# Patient Record
Sex: MALE | Race: WHITE | Employment: FULL TIME | URBAN - METROPOLITAN AREA
[De-identification: names, ages, dates, MRNs, and addresses within clinical notes are randomized per-mention and may not be internally consistent; named-entity substitution may affect disease eponyms.]

---

## 2018-07-26 ENCOUNTER — HOSPITAL ENCOUNTER (OUTPATIENT)
Dept: PHYSICAL THERAPY | Age: 23
Discharge: HOME | End: 2018-07-26
Payer: COMMERCIAL

## 2018-07-26 PROCEDURE — 97110 THERAPEUTIC EXERCISES: CPT

## 2018-07-26 PROCEDURE — 97161 PT EVAL LOW COMPLEX 20 MIN: CPT

## 2018-07-26 NOTE — PROGRESS NOTES
[address] PHYS[facility] THERAPY AT [facility]  [address] [address], [place]  Phone [phone]  Fax [phone] / [address] [address] PHYSICAL THERAPY SERVICES  Patient Name: Akin Zaragoza : 1995   Medical   Diagnosis: Left foot pain [M79.672] Treatment Diagnosis: Left foot pain [M79.672]   Onset Date: 2017     Referral Source: Malcolm Lujan DPM Start of Care [facility]): 2018   Prior Hospitalization: See medical history Provider #: [id number]   Prior Level of Function: No pain with ambulation   Comorbidities: NA   Medications: Verified on Patient Summary List   The Plan of Care and following information is based on the information from the initial evaluation.   ===========================================================================================  Assessment / key information:  Patient is a 26 y.o. male who presents to [facility] with a diagnosis of Left foot pain [M79.672]. Patient is his own historian. Living situation is as follows: [place]. Occupation: manager at [employer]. Current Deficits include: pain, edema, decreased mobility, decreased strength, and decreased postural awareness with resulting limitations in ADL's and in functional abilities. Pt presents with c/o left foot pain localized to the volar surface of the foot. Pain began on 2017 when pt bent it the \"wrong way with a pallet galina\". Xrays initially showed no fracture, but when pain continued, pt sought a second medical opinion. He was placed in a left LE CAM boot for 6 months which was removed in 2018. Pt had repeat xrays at that time which showed healing. Pt continues to work FT at [employer] on concrete floors donning steel toe boots. Job duties require climbing ladders and working in [employer]. No numbness/red flags/ foot drop. Pain is primary complaint at this time. Impairments are as follows:   Pain 3/10 localized to the volar surface of the left 2nd and 3rd MTs    Posture mild pes planus and genu varum    Gait antalgic with significantly decreased stance in weightbearing LLE, decreased push off, and decreased stride    AROM/PROM:  Ankle in degrees  DF  L 10    R 15  PF  L 60 with pain   R 60  Eversion and inversion WFLs and pain free    Strength:  Left PF 4-/5 with pain  All other planes WNLs   5/5 strength left extensor hallucis    Special Tests:  circumferential measures:  Figure 8 at ankles: L 70cm R 69cm  Mid gastrocs: L 36cm R 37cm  10cm proximal to superior patellar poles: L 45cm R 47cm    + hypomobility between left 2nd and 3rd MTs     + Pain with isometric contraction and stretch of extensor digitorum LLE    Functional Tests  LLE SLS 4sec  2 heel raises with double leg stance    Functional Deficits include: unable to climb ladders, run, surf, kneel. Patient's FOTO score was a 40/100 indicating decreased function.  Patient will benefit from a POC addressing such impairments and limitations in order to improve quality of life and return to [facility].    ==================================================================================Eval Complexity: History: LOW Complexity : Zero comorbidities / personal factors that will impact the outcome / POCExam:HIGH Complexity : 4+ Standardized tests and measures addressing body structure, function, activity limitation and / or participation in recreation  Presentation: LOW Complexity : Stable, uncomplicated  Clinical Decision Making:MEDIUM Complexity : FOTO score of 26-74Overall Complexity:LOW   Problem List: pain affecting function, decrease ROM, decrease strength, edema affecting function and impaired gait/ balance   Treatment Plan may include any combination of the following: Therapeutic exercise, Therapeutic activities, Neuromuscular re-education, Physical agent/modality, Gait/balance training, Manual therapy, Patient education, Self Care training, Functional mobility training, Home safety training and Stair training  Patient / Family readiness to learn indicated by: asking questions, trying to perform skills and interest  Persons(s) to be included in education: patient (P)  Barriers to Learning/Limitations: None  Measures taken: A HEP was initiated to assist with POC in restoring function   Patient Goal (s): Surf and walk without pain   Patient self reported health status: good  Rehabilitation Potential: excellent   Short Term Goals: To be accomplished in  2  treatments:  1. Pt will be compliant with HEP for symptom management at home.  Long Term Goals: To be accomplished in  8-12  treatments:  1. Pt will demonstrate an increased FOTO score to 63/100 in order to improve function  2. Pt will be independent with HEP at D/C for self management. 3. Pt will demonstrate decreased edema in the left ankle by 1cm in order to increase mobility for functional ambulation  4. Pt will demonstrate increased girth in the mid gastrocs and 10cm proximal to superior patellar poles by 1cm+ in order to aid in resolving atrophy and improving ability to ambulate  5. Pt will be able to perform LLE SLS on dynamic surface for 20sec+ in order to increase balance required for surfing  6. Pt will be able to ambulate 1 mile on TM with proper footwear and without c/o left foot pain in order to negotiate [employer] concrete floor as before    Frequency / Duration:     Patient to be seen  2  times per week for 8-12  treatments:  Patient / Caregiver education and instruction: exercises  G-Forrest (GP):   Therapist Signature: Carmen Stout PT Date: 9/54/0536   Certification Period:  Time: 11:06 AM   ===========================================================================================  I certify that the above Physical Therapy Services are being furnished while the patient is under my care.   I agree with the treatment plan and certify that this therapy is necessary. Physician Signature:        Date:       Time:     Please sign and return to [facility] or you may fax the signed copy to [phone]. Thank you.

## 2018-07-26 NOTE — PROGRESS NOTES
PT ANKLE EVAL AND TREATMENT     Patient Name: Betito Mcgregor  Date:2018  : 1995  [x]  Patient  Verified  Payor: Daniele Levin / Plan: [facility] PPO / Product Type: PPO /    In time:840  Out time: 925  Total Treatment Time (min): 45  Total Timed Codes (min): 25  1:1 Treatment Time ( only):    Visit #: 1 of     Treatment Area: Left foot pain [M79.672]    SUBJECTIVE  Pain Level (0-10 on visual analog scale): 3  Any medication changes, allergies to medications, diagnosis change, or new procedure performed?: see summary sheet for update  Subjective functional status/changes:   [] No changes reported  Patient is a 26 y.o. male who presents to [facility] with a diagnosis of Left foot pain [M79.672]. Patient is his own historian. Living situation is as follows: [place]. Occupation: manager at [employer]. Current Deficits include: pain, edema, decreased mobility, decreased strength, and decreased postural awareness with resulting limitations in ADL's and in functional abilities. Pt presents with c/o left foot pain localized to the volar surface of the foot. Pain began on 2017 when pt bent it the \"wrong way with a pallet galina\". Xrays initially showed no fracture, but when pain continued, pt sought a second medical opinion. He was placed in a left LE CAM boot for 6 months which was removed in 2018. Pt had repeat xrays at that time which showed healing. Pt continues to work FT at [employer] on concrete floors donning steel toe boots. Job duties require climbing ladders and working in [employer]. No numbness/red flags/ foot drop. Pain is primary complaint at this time.      Impairments are as follows:   Pain 3/10 localized to the volar surface of the left 2nd and 3rd MTs    Posture mild pes planus and genu varum    Gait antalgic with significantly decreased stance in weightbearing LLE, decreased push off, and decreased stride    AROM/PROM:  Ankle in degrees  DF  L 10    R 15  PF  L 60 with pain   R 60  Eversion and inversion WFLs and pain free    Strength:  Left PF 4-/5 with pain  All other planes WNLs   5/5 strength left extensor hallucis    Special Tests:  circumferential measures:  Figure 8 at ankles: L 70cm R 69cm  Mid gastrocs: L 36cm R 37cm  10cm proximal to superior patellar poles: L 45cm R 47cm    + hypomobility between left 2nd and 3rd MTs     + Pain with isometric contraction and stretch of extensor digitorum LLE    Functional Tests  LLE SLS 4sec  2 heel raises with double leg stance    Patient Education: [x] Established HEP    [x] POT   (minutes) : 25min HEP administered and reviewed   Patient with verbalized and demonstrated understanding of HEP/POC.      Pain Level (0-10 scale) post treatment: 3     ASSESSMENT  [x]  See Plan of Care    PLAN  [x]  Upgrade activities as tolerated     [x] Other:_    See POC for Frequency/duration of visits         Justification for Eval Code Complexity:   Patient History : see POC  Examination: (see exam)  Clinical Presentation: stable  Clinical Decision Making : FOTO : 40 /100     G-Codes (GP):     Mattie Hall, PT 7/26/2018  11:30 AM

## 2018-07-31 ENCOUNTER — HOSPITAL ENCOUNTER (OUTPATIENT)
Dept: PHYSICAL THERAPY | Age: 23
Discharge: HOME | End: 2018-07-31
Payer: COMMERCIAL

## 2018-07-31 PROCEDURE — 97140 MANUAL THERAPY 1/> REGIONS: CPT | Performed by: PHYSICAL THERAPIST

## 2018-07-31 PROCEDURE — 97110 THERAPEUTIC EXERCISES: CPT | Performed by: PHYSICAL THERAPIST

## 2018-07-31 NOTE — PROGRESS NOTES
PT DAILY TREATMENT NOTE     Patient Name: Cony Arnold  Date:2018  : 1995  [x]  Patient  Verified  Payor: BLUE CROSS / Plan: [facility] PPO / Product Type: PPO /    In time:230  Out time:328  Total Treatment Time (min): 62  Visit #: 2 of     Treatment Area: Left foot pain [M79.672]    SUBJECTIVE  Pain Level (0-10 scale): 210  Any medication changes, allergies to medications, adverse drug reactions, diagnosis change, or new procedure performed?: [x] No    [] Yes (see summary sheet for update)  Subjective functional status/changes:   [] No changes reported  Pt retold subjective hx.      OBJECTIVE    Modality rationale: decrease pain and increase tissue extensibility to improve the patients ability to improve standing tolerance   Min Type Additional Details    [] Estim:  []Unatt       []IFC  []Premod                        []Other:  []w/ice   []w/heat  Position:  Location:    [] Estim: []Att    []TENS instruct  []NMES                    []Other:  []w/US   []w/ice   []w/heat  Position:  Location:    []  Traction: [] Cervical       []Lumbar                       [] Prone          []Supine                       []Intermittent   []Continuous Lbs:  [] before manual  [] after manual    []  Ultrasound: []Continuous   [] Pulsed                           []1MHz   []3MHz W/cm2:  Location:    []  Iontophoresis with dexamethasone         Location: [] Take home patch   [] In clinic   10 []  Ice     [x]  heat  []  Ice massage  []  Laser   []  Anodyne Position: semi-reclined  Location: L foot    []  Laser with stim  []  Other:  Position:  Location:    []  Vasopneumatic Device Pressure:       [] lo [] med [] hi   Temperature: [] lo [] med [] hi   [] Skin assessment post-treatment:  []intact []redness- no adverse reaction    []redness - adverse reaction:       38 min Therapeutic Exercise:  [x] See flow sheet :initiated PT POC   Rationale: increase ROM, increase strength, improve balance and increase proprioception to improve the patients ability to improve overall activity tolerance    10 min Manual Therapy: Technique:      [x] S/DTM []IASTM []PROM [] Passive Stretching   [] Graston:  [] GT 1  [] GT 2 [x] GT 3 [x] GT 4 [] GT 4 [] GT 5  [] GT 6  [] Sweep [] Fan [] [place]  [] Brush   []  Swivel []J- Stroke [] Scoop []IFraming     []Manual TPR  [] TDN (see objective; actual needle insertion time not billed)  []Jt manipulation:Gr I [] II []  III [] IV[] V[]  Treatment/Area:  L plantar fascia   Rationale:      decrease pain, increase ROM, increase tissue extensibility and decrease trigger points to improve patient's ability to improve weight-bearing tolerance        With   [] TE   [] TA   [] neuro   [] other: Patient Education: [x] Review HEP    [] Progressed/Changed HEP based on:   [] positioning   [] body mechanics   [] transfers   [] heat/ice application    [] Graston Education: Explained the effects and benefits of Graston Technique therapy including potential for post treatment soreness and bruising. [] Other:      Other Objective/Functional Measures:   Noted tightness in L plantar fascia       Pain Level (0-10 scale) post treatment: 0/10    ASSESSMENT/Changes in Function: Pt tolerated all therex without increased pain. Noted tightness in L plantar fascia due to decreased heel toe pattern while wearing the boot for prolonged period time. Restrictions lessened after manual interventions. Pt educated to wear tennis shoes to do standing therex to improve foot stability. Patient will continue to benefit from skilled PT services to modify and progress therapeutic interventions, address functional mobility deficits, address ROM deficits, address strength deficits, analyze and address soft tissue restrictions and address imbalance/dizziness to attain remaining goals.      []  See Plan of Care  []  See progress note/recertification  []  See Discharge Summary         Progress towards goals / Updated goals:  1st visit, initiated PT POC    PLAN  [x]  Upgrade activities as tolerated     [x]  Continue plan of care  []  Update interventions per flow sheet       []  Discharge due to:_  [x]  Other:_ assess effects of treatment next session     Myra Gandhi DPT 7/31/2018  3:32 PM    Future Appointments  Date Time Provider Vishnu Yi   8/2/2018 11:00 AM Myra Gandhi, DPT [facility]   8/7/2018 11:00 AM Myra Gandhi, DPT [facility]   8/9/2018 11:00 AM Myra Gandhi, DPT [facility]   8/14/2018 11:00 AM Myra Gandhi, DPT [facility]   8/16/2018 11:00 AM Myra Gandhi, DPT [facility]   8/21/2018 11:00 AM Myra Gandhi, DPT [facility]   8/23/2018 11:00 AM Myra Gandhi, DPT [facility]   8/28/2018 11:00 AM Myra Gandhi, DPT [facility]   8/30/2018 11:00 AM Myra Gandhi, DPT [facility]

## 2018-08-02 ENCOUNTER — APPOINTMENT (OUTPATIENT)
Dept: PHYSICAL THERAPY | Age: 23
End: 2018-08-02
Payer: COMMERCIAL

## 2018-08-07 ENCOUNTER — HOSPITAL ENCOUNTER (OUTPATIENT)
Dept: PHYSICAL THERAPY | Age: 23
Discharge: HOME | End: 2018-08-07
Payer: COMMERCIAL

## 2018-08-07 PROCEDURE — 97140 MANUAL THERAPY 1/> REGIONS: CPT | Performed by: PHYSICAL THERAPIST

## 2018-08-07 PROCEDURE — 97110 THERAPEUTIC EXERCISES: CPT | Performed by: PHYSICAL THERAPIST

## 2018-08-07 NOTE — PROGRESS NOTES
PT DAILY TREATMENT NOTE     Patient Name: Julianna Fountain  Date:2018  : 1995  [x]  Patient  Verified  Payor: BLUE CROSS / Plan: [facility] PPO / Product Type: PPO /    In time:1100 Out time:1150  Total Treatment Time (min): 50  Visit #: 3 of     Treatment Area: Left foot pain [M79.672]    SUBJECTIVE  Pain Level (0-10 scale): 0/10  Any medication changes, allergies to medications, adverse drug reactions, diagnosis change, or new procedure performed?: [x] No    [] Yes (see summary sheet for update)  Subjective functional status/changes:   [] No changes reported  Pt stated that he was sore after last session but he has had no pain since last session. He notes though that his R side of the body has started to get sore.        OBJECTIVE    Modality rationale: decrease pain and increase tissue extensibility to improve the patients ability to improve standing tolerance   Min Type Additional Details    [] Estim:  []Unatt       []IFC  []Premod                        []Other:  []w/ice   []w/heat  Position:  Location:    [] Estim: []Att    []TENS instruct  []NMES                    []Other:  []w/US   []w/ice   []w/heat  Position:  Location:    []  Traction: [] Cervical       []Lumbar                       [] Prone          []Supine                       []Intermittent   []Continuous Lbs:  [] before manual  [] after manual    []  Ultrasound: []Continuous   [] Pulsed                           []1MHz   []3MHz W/cm2:  Location:    []  Iontophoresis with dexamethasone         Location: [] Take home patch   [] In clinic   10 []  Ice     [x]  heat  []  Ice massage  []  Laser   []  Anodyne Position: semi-reclined  Location: L foot    []  Laser with stim  []  Other:  Position:  Location:    []  Vasopneumatic Device Pressure:       [] lo [] med [] hi   Temperature: [] lo [] med [] hi   [] Skin assessment post-treatment:  []intact []redness- no adverse reaction    []redness - adverse reaction:       30 min Therapeutic Exercise:  [x] See flow sheet :added therex per flow sheet   Rationale: increase ROM, increase strength, improve balance and increase proprioception to improve the patients ability to improve overall activity tolerance    10 min Manual Therapy: Technique:      [x] S/DTM []IASTM []PROM [] Passive Stretching   [] Graston:  [] GT 1  [] GT 2 [x] GT 3 [x] GT 4 [] GT 4 [] GT 5  [] GT 6  [] Sweep [] Fan [] [place]  [] Brush   []  Swivel []J- Stroke [] Scoop []IFraming     []Manual TPR  [] TDN (see objective; actual needle insertion time not billed)  []Jt manipulation:Gr I [] II []  III [] IV[] V[]  Treatment/Area:  L plantar fascia   Rationale:      decrease pain, increase ROM, increase tissue extensibility and decrease trigger points to improve patient's ability to improve weight-bearing tolerance        With   [] TE   [] TA   [] neuro   [] other: Patient Education: [x] Review HEP    [] Progressed/Changed HEP based on:   [] positioning   [] body mechanics   [] transfers   [] heat/ice application    [] Graston Education: Explained the effects and benefits of Graston Technique therapy including potential for post treatment soreness and bruising. [] Other:      Other Objective/Functional Measures:   Reduced tightness in the L plantar fascia         Pain Level (0-10 scale) post treatment: 0/10    ASSESSMENT/Changes in Function: Pt tolerated progressed therex without increased pain and improved stability. Noted improvement in overall muscle flexibility with reduced pain. Assess ability to return to running next session if no pain. Patient will continue to benefit from skilled PT services to modify and progress therapeutic interventions, address functional mobility deficits, address ROM deficits, address strength deficits, analyze and address soft tissue restrictions and address imbalance/dizziness to attain remaining goals. Progress towards goals / Updated goals: · Short Term Goals:  To be accomplished in  2  treatments:  1. Pt will be compliant with HEP for symptom management at home.        · Long Term Goals: To be accomplished in  8-12  treatments:  1. Pt will demonstrate an increased FOTO score to 63/100 in order to improve function  2. Pt will be independent with HEP at D/ for self management. 3. Pt will demonstrate decreased edema in the left ankle by 1cm in order to increase mobility for functional ambulation  4. Pt will demonstrate increased girth in the mid gastrocs and 10cm proximal to superior patellar poles by 1cm+ in order to aid in resolving atrophy and improving ability to ambulate  5. Pt will be able to perform LLE SLS on dynamic surface for 20sec+ in order to increase balance required for surfing  6.  Pt will be able to ambulate 1 mile on  with proper footwear and without c/o left foot pain in order to negotiate [facility] floor as before       PLAN  [x]  Upgrade activities as tolerated     [x]  Continue plan of care  []  Update interventions per flow sheet       []  Discharge due to:_  [x]  Other:_check goals    Janie Lackey DPT 8/7/2018  3:21 PM    Future Appointments  Date Time Provider Vishnu Yi   8/9/2018 11:00 AM Janie Lackey DPT [facility]   8/14/2018 11:00 AM Janie Lackey DPT [facility]   8/16/2018 11:00 AM Janie Lackey DPT [facility]   8/21/2018 11:00 AM Janie Lackey DPT [facility]   8/23/2018 11:00 AM Janie Lackey DPT [facility]   8/28/2018 11:00 AM Janie Lackey DPT [facility]   8/30/2018 11:00 AM Janie Lackey DPT [facility]

## 2018-08-09 ENCOUNTER — HOSPITAL ENCOUNTER (OUTPATIENT)
Dept: PHYSICAL THERAPY | Age: 23
Discharge: HOME | End: 2018-08-09
Payer: COMMERCIAL

## 2018-08-09 PROCEDURE — 97110 THERAPEUTIC EXERCISES: CPT | Performed by: PHYSICAL THERAPIST

## 2018-08-09 PROCEDURE — 97140 MANUAL THERAPY 1/> REGIONS: CPT | Performed by: PHYSICAL THERAPIST

## 2018-08-09 NOTE — PROGRESS NOTES
PT DAILY TREATMENT NOTE     Patient Name: Sherry Vasquez  Date:2018  : 1995  [x]  Patient  Verified  Payor: BLUE CROSS / Plan: [facility] BLUE [facility] PPO / Product Type: PPO /    In time:1100 Out time:1150  Total Treatment Time (min): 50  Visit #: 4 of     Treatment Area: Left foot pain [M79.672]    SUBJECTIVE  Pain Level (0-10 scale): 0/10  Any medication changes, allergies to medications, adverse drug reactions, diagnosis change, or new procedure performed?: [x] No    [] Yes (see summary sheet for update)  Subjective functional status/changes:   [] No changes reported  Pt notes that he was able to run 2 miles straight with a little walking however, he had increased soreness in the L foot for a few days following.       OBJECTIVE    Modality rationale: decrease pain and increase tissue extensibility to improve the patients ability to improve standing tolerance   Min Type Additional Details    [] Estim:  []Unatt       []IFC  []Premod                        []Other:  []w/ice   []w/heat  Position:  Location:    [] Estim: []Att    []TENS instruct  []NMES                    []Other:  []w/US   []w/ice   []w/heat  Position:  Location:    []  Traction: [] Cervical       []Lumbar                       [] Prone          []Supine                       []Intermittent   []Continuous Lbs:  [] before manual  [] after manual    []  Ultrasound: []Continuous   [] Pulsed                           []1MHz   []3MHz W/cm2:  Location:    []  Iontophoresis with dexamethasone         Location: [] Take home patch   [] In clinic   10 []  Ice     [x]  heat  []  Ice massage  []  Laser   []  Anodyne Position: semi-reclined  Location: L foot    []  Laser with stim  []  Other:  Position:  Location:    []  Vasopneumatic Device Pressure:       [] lo [] med [] hi   Temperature: [] lo [] med [] hi   [] Skin assessment post-treatment:  []intact []redness- no adverse reaction    []redness - adverse reaction:       30 min Therapeutic Exercise:  [x] See flow sheet :added therex per flow sheet   Rationale: increase ROM, increase strength, improve balance and increase proprioception to improve the patients ability to improve overall activity tolerance    10 min Manual Therapy: Technique:      [x] S/DTM []IASTM []PROM [] Passive Stretching   [] Graston:  [] GT 1  [] GT 2 [x] GT 3 [x] GT 4 [] GT 4 [] GT 5  [] GT 6  [] Sweep [] Fan [] [place]  [] Brush   []  Swivel []J- Stroke [] Scoop []IFraming     []Manual TPR  [] TDN (see objective; actual needle insertion time not billed)  []Jt manipulation:Gr I [] II []  III [] IV[] V[]  Treatment/Area:  L plantar fascia/dorsum of the foot   Rationale:      decrease pain, increase ROM, increase tissue extensibility and decrease trigger points to improve patient's ability to improve weight-bearing tolerance        With   [] TE   [] TA   [] neuro   [] other: Patient Education: [x] Review HEP    [] Progressed/Changed HEP based on:   [] positioning   [] body mechanics   [] transfers   [] heat/ice application    [] Graston Education: Explained the effects and benefits of Graston Technique therapy including potential for post treatment soreness and bruising. [] Other:      Other Objective/Functional Measures:   Reduced tightness in the L plantar fasica  Pt was able to perform standing therex without increased pain         Pain Level (0-10 scale) post treatment: 0/10    ASSESSMENT/Changes in Function: Pt tolerated added standing therex without increased pain in the L foot. High impact activities continue to bother fx site on the L foot. Pt educated to trial walk/run program and slowly return to long distance running to return to PLOF.      Patient will continue to benefit from skilled PT services to modify and progress therapeutic interventions, address functional mobility deficits, address ROM deficits, address strength deficits, analyze and address soft tissue restrictions and address imbalance/dizziness to attain remaining goals. Progress towards goals / Updated goals: · Short Term Goals: To be accomplished in  2  treatments:  1. Pt will be compliant with HEP for symptom management at home.        · Long Term Goals: To be accomplished in  8-12  treatments:  1. Pt will demonstrate an increased FOTO score to 63/100 in order to improve function  2. Pt will be independent with HEP at D/C for self management. 3. Pt will demonstrate decreased edema in the left ankle by 1cm in order to increase mobility for functional ambulation  4. Pt will demonstrate increased girth in the mid gastrocs and 10cm proximal to superior patellar poles by 1cm+ in order to aid in resolving atrophy and improving ability to ambulate  5. Pt will be able to perform LLE SLS on dynamic surface for 20sec+ in order to increase balance required for surfing  6.  Pt will be able to ambulate 1 mile on TM with proper footwear and without c/o left foot pain in order to negotiate [employer] concrete floor as beforeprogressing, pt ran 2 miles with pain 8/9/18       PLAN  [x]  Upgrade activities as tolerated     [x]  Continue plan of care  []  Update interventions per flow sheet       []  Discharge due to:_  []  Other:_    Andie Montgomery DPT 8/9/2018  3:21 PM    Future Appointments  Date Time Provider Vishnu Yi   8/14/2018 11:00 AM Andie Montgomery DPT [facility]   8/16/2018 11:00 AM Andie Montgomery DPT [facility]   8/21/2018 11:00 AM Andie Montgomery DPT [facility]   8/23/2018 11:00 AM Andie Montgomery DPT [facility]   8/28/2018 11:00 AM Andie Montgomery DPT [facility]   8/30/2018 11:00 AM Andie Montgomery DPT [facility]

## 2018-08-14 ENCOUNTER — HOSPITAL ENCOUNTER (OUTPATIENT)
Dept: PHYSICAL THERAPY | Age: 23
Discharge: HOME | End: 2018-08-14
Payer: COMMERCIAL

## 2018-08-14 PROCEDURE — 97110 THERAPEUTIC EXERCISES: CPT | Performed by: PHYSICAL THERAPIST

## 2018-08-14 NOTE — PROGRESS NOTES
PT DAILY TREATMENT NOTE     Patient Name: Candie Castillo  Date:2018  : 1995  [x]  Patient  Verified  Payor: BLUE CROSS / Plan: [facility] PPO / Product Type: PPO /    In time:1100 Out time:1149  Total Treatment Time (min): 49  1:1 treatment time: 19  Visit #: 5 of     Treatment Area: Left foot pain [M79.922]    SUBJECTIVE  Pain Level (0-10 scale): 0.5/10  Any medication changes, allergies to medications, adverse drug reactions, diagnosis change, or new procedure performed?: [x] No    [] Yes (see summary sheet for update)  Subjective functional status/changes:   [] No changes reported  Pt reports that he ran/walked for 2 miles and had less pain.        OBJECTIVE    Modality rationale: decrease pain and increase tissue extensibility to improve the patients ability to improve standing tolerance   Min Type Additional Details    [] Estim:  []Unatt       []IFC  []Premod                        []Other:  []w/ice   []w/heat  Position:  Location:    [] Estim: []Att    []TENS instruct  []NMES                    []Other:  []w/US   []w/ice   []w/heat  Position:  Location:    []  Traction: [] Cervical       []Lumbar                       [] Prone          []Supine                       []Intermittent   []Continuous Lbs:  [] before manual  [] after manual    []  Ultrasound: []Continuous   [] Pulsed                           []1MHz   []3MHz W/cm2:  Location:    []  Iontophoresis with dexamethasone         Location: [] Take home patch   [] In clinic   PD []  Ice     [x]  heat  []  Ice massage  []  Laser   []  Anodyne Position: semi-reclined  Location: L foot    []  Laser with stim  []  Other:  Position:  Location:    []  Vasopneumatic Device Pressure:       [] lo [] med [] hi   Temperature: [] lo [] med [] hi   [] Skin assessment post-treatment:  []intact []redness- no adverse reaction    []redness - adverse reaction:       49 min Therapeutic Exercise:  [x] See flow sheet :progressed PREs per flow sheet   Rationale: increase ROM, increase strength, improve balance and increase proprioception to improve the patients ability to improve overall activity tolerance    H min Manual Therapy: Technique:      [x] S/DTM []IASTM []PROM [] Passive Stretching   [] Graston:  [] GT 1  [] GT 2 [x] GT 3 [x] GT 4 [] GT 4 [] GT 5  [] GT 6  [] Sweep [] Fan [] [place]  [] Brush   []  Swivel []J- Stroke [] Scoop []IFraming     []Manual TPR  [] TDN (see objective; actual needle insertion time not billed)  []Jt manipulation:Gr I [] II []  III [] IV[] V[]  Treatment/Area:  L plantar fascia/dorsum of the foot   Rationale:      decrease pain, increase ROM, increase tissue extensibility and decrease trigger points to improve patient's ability to improve weight-bearing tolerance        With   [] TE   [] TA   [] neuro   [] other: Patient Education: [x] Review HEP    [] Progressed/Changed HEP based on:   [] positioning   [] body mechanics   [] transfers   [] heat/ice application    [] Graston Education: Explained the effects and benefits of Graston Technique therapy including potential for post treatment soreness and bruising. [] Other:      Other Objective/Functional Measures:            Pain Level (0-10 scale) post treatment: 0/10    ASSESSMENT/Changes in Function:   Pt tolerated added high impact exercise without increased foot pain. Add plyometric exercises next session. Patient will continue to benefit from skilled PT services to modify and progress therapeutic interventions, address functional mobility deficits, address ROM deficits, address strength deficits, analyze and address soft tissue restrictions and address imbalance/dizziness to attain remaining goals. Progress towards goals / Updated goals: · Short Term Goals: To be accomplished in  2  treatments:  1. Pt will be compliant with HEP for symptom management at home.        · Long Term Goals: To be accomplished in  8-12  treatments:  1.  Pt will demonstrate an increased FOTO score to 63/100 in order to improve function  2. Pt will be independent with HEP at D/C for self management. 3. Pt will demonstrate decreased edema in the left ankle by 1cm in order to increase mobility for functional ambulation  4. Pt will demonstrate increased girth in the mid gastrocs and 10cm proximal to superior patellar poles by 1cm+ in order to aid in resolving atrophy and improving ability to ambulate  5. Pt will be able to perform LLE SLS on dynamic surface for 20sec+ in order to increase balance required for surfing  6.  Pt will be able to ambulate 1 mile on TM with proper footwear and without c/o left foot pain in order to negotiate [employer] concrete floor as beforeprogressing, pt ran 2 miles with pain 8/9/18       PLAN  [x]  Upgrade activities as tolerated     [x]  Continue plan of care  []  Update interventions per flow sheet       []  Discharge due to:_  [x]  Other: check goals next session    Inocencio Godinez DPT 8/14/2018  229  PM    Future Appointments  Date Time Provider Vishnu Yi   8/16/2018 11:00 AM Inocencio Godinez DPT [facility]   8/21/2018 11:00 AM Inocencio Godinez DPT [facility]   8/23/2018 11:00 AM Inocencio Godinez DPT [facility]   8/28/2018 11:00 AM Inocencio Godinez DPT [facility]   8/30/2018 11:00 AM Inocencio Godinez DPT [facility]

## 2018-08-16 ENCOUNTER — HOSPITAL ENCOUNTER (OUTPATIENT)
Dept: PHYSICAL THERAPY | Age: 23
Discharge: HOME | End: 2018-08-16
Payer: COMMERCIAL

## 2018-08-16 PROCEDURE — 97110 THERAPEUTIC EXERCISES: CPT | Performed by: PHYSICAL THERAPIST

## 2018-08-21 ENCOUNTER — HOSPITAL ENCOUNTER (OUTPATIENT)
Dept: PHYSICAL THERAPY | Age: 23
Discharge: HOME | End: 2018-08-21
Payer: COMMERCIAL

## 2018-08-21 PROCEDURE — 97110 THERAPEUTIC EXERCISES: CPT | Performed by: PHYSICAL THERAPIST

## 2018-08-21 NOTE — PROGRESS NOTES
PT DAILY TREATMENT NOTE     Patient Name: Candie Castillo  Date:2018  : 1995  [x]  Patient  Verified  Payor: BLUE CROSS / Plan: [facility] PPO / Product Type: PPO /    In time:1100 Out time:1146  Total Treatment Time (min): 46  1:1 treatment time: 30  Visit #: 7 of     Treatment Area: Left foot pain [M79.132]    SUBJECTIVE  Pain Level (0-10 scale): 0/10  Any medication changes, allergies to medications, adverse drug reactions, diagnosis change, or new procedure performed?: [x] No    [] Yes (see summary sheet for update)  Subjective functional status/changes:   [x] No changes reported  I have no pain    OBJECTIVE    Modality rationale: decrease pain and increase tissue extensibility to improve the patients ability to improve standing tolerance   Min Type Additional Details    [] Estim:  []Unatt       []IFC  []Premod                        []Other:  []w/ice   []w/heat  Position:  Location:    [] Estim: []Att    []TENS instruct  []NMES                    []Other:  []w/US   []w/ice   []w/heat  Position:  Location:    []  Traction: [] Cervical       []Lumbar                       [] Prone          []Supine                       []Intermittent   []Continuous Lbs:  [] before manual  [] after manual    []  Ultrasound: []Continuous   [] Pulsed                           []1MHz   []3MHz W/cm2:  Location:    []  Iontophoresis with dexamethasone         Location: [] Take home patch   [] In clinic   PD []  Ice     [x]  heat  []  Ice massage  []  Laser   []  Anodyne Position: semi-reclined  Location: L foot    []  Laser with stim  []  Other:  Position:  Location:    []  Vasopneumatic Device Pressure:       [] lo [] med [] hi   Temperature: [] lo [] med [] hi   [] Skin assessment post-treatment:  []intact []redness- no adverse reaction    []redness - adverse reaction:       46 min Therapeutic Exercise:  [x] See flow sheet    Rationale: increase ROM, increase strength, improve balance and increase proprioception to improve the patients ability to improve overall activity tolerance    H min Manual Therapy: Technique:      [x] S/DTM []IASTM []PROM [] Passive Stretching   [] Graston:  [] GT 1  [] GT 2 [x] GT 3 [x] GT 4 [] GT 4 [] GT 5  [] GT 6  [] Sweep [] Fan [] [place]  [] Brush   []  Swivel []J- Stroke [] Scoop []IFraming     []Manual TPR  [] TDN (see objective; actual needle insertion time not billed)  []Jt manipulation:Gr I [] II []  III [] IV[] V[]  Treatment/Area:  L plantar fascia/dorsum of the foot   Rationale:      decrease pain, increase ROM, increase tissue extensibility and decrease trigger points to improve patient's ability to improve weight-bearing tolerance        With   [] TE   [] TA   [] neuro   [] other: Patient Education: [x] Review HEP    [] Progressed/Changed HEP based on:   [] positioning   [] body mechanics   [] transfers   [] heat/ice application    [] Graston Education: Explained the effects and benefits of Graston Technique therapy including potential for post treatment soreness and bruising. [] Other:      Other Objective/Functional Measures:        Pain Level (0-10 scale) post treatment: 0/10    ASSESSMENT/Changes in Function:   Pt continues to have no pain with high level plyometric activities. Pt due for reassessment next session and possible DC. Patient will continue to benefit from skilled PT services to modify and progress therapeutic interventions, address functional mobility deficits, address ROM deficits, address strength deficits, analyze and address soft tissue restrictions and address imbalance/dizziness to attain remaining goals. Progress towards goals / Updated goals: · Short Term Goals: To be accomplished in  2  treatments:  1. Pt will be compliant with HEP for symptom management at home. Met 8/16/18        · Long Term Goals: To be accomplished in  8-12  treatments:  1.  Pt will demonstrate an increased FOTO score to 63/100 in order to improve function  2. Pt will be independent with HEP at D/C for self management. met 8/21/18  3. Pt will demonstrate decreased edema in the left ankle by 1cm in order to increase mobility for functional ambulation  4. Pt will demonstrate increased girth in the mid gastrocs and 10cm proximal to superior patellar poles by 1cm+ in order to aid in resolving atrophy and improving ability to ambulate  5. Pt will be able to perform LLE SLS on dynamic surface for 20sec+ in order to increase balance required for surfing  6. Pt will be able to ambulate 1 mile on TM with proper footwear and without c/o left foot pain in order to negotiate [email or web address] concrete floor as beforeprogressing, pt ran 2 miles with pain 8/9/18       PLAN  [x]  Upgrade activities as tolerated     [x]  Continue plan of care  []  Update interventions per flow sheet       []  Discharge due to:_  [x]  Other: check goals next session for DC.      Denise Jain DPT 8/21/2018  128 PM    Future Appointments  Date Time Provider Vishnu Yi   8/23/2018 11:00 AM Denise Jain DPT [facility]   8/28/2018 11:00 AM Denise Jain DPT [facility]   8/30/2018 11:00 AM Denise Jain DPT [facility]

## 2018-08-23 ENCOUNTER — HOSPITAL ENCOUNTER (OUTPATIENT)
Dept: PHYSICAL THERAPY | Age: 23
Discharge: HOME | End: 2018-08-23
Payer: COMMERCIAL

## 2018-08-23 PROCEDURE — 97110 THERAPEUTIC EXERCISES: CPT | Performed by: PHYSICAL THERAPIST

## 2018-08-23 NOTE — PROGRESS NOTES
PT DAILY TREATMENT NOTE     Patient Name: Ana Maria Rutherford  Date:2018  : 1995  [x]  Patient  Verified  Payor: BLUE CROSS / Plan: [facility] PPO / Product Type: PPO /    In time:1101 Out time:1138  Total Treatment Time (min): 37  1:1 treatment time: 20  Visit #:8 of     Treatment Area: Left foot pain [M79.122]    SUBJECTIVE  Pain Level (0-10 scale): 0/10  Any medication changes, allergies to medications, adverse drug reactions, diagnosis change, or new procedure performed?: [x] No    [] Yes (see summary sheet for update)  Subjective functional status/changes:   [x] No changes reported  Pt denies pain with functional mobility. He reports that he has returned to a run/walk program for 30 min without restrictions. He can also work a shift at work without difficulty.      OBJECTIVE    Modality rationale: decrease pain and increase tissue extensibility to improve the patients ability to improve standing tolerance   Min Type Additional Details    [] Estim:  []Unatt       []IFC  []Premod                        []Other:  []w/ice   []w/heat  Position:  Location:    [] Estim: []Att    []TENS instruct  []NMES                    []Other:  []w/US   []w/ice   []w/heat  Position:  Location:    []  Traction: [] Cervical       []Lumbar                       [] Prone          []Supine                       []Intermittent   []Continuous Lbs:  [] before manual  [] after manual    []  Ultrasound: []Continuous   [] Pulsed                           []1MHz   []3MHz W/cm2:  Location:    []  Iontophoresis with dexamethasone         Location: [] Take home patch   [] In clinic   PD []  Ice     [x]  heat  []  Ice massage  []  Laser   []  Anodyne Position: semi-reclined  Location: L foot    []  Laser with stim  []  Other:  Position:  Location:    []  Vasopneumatic Device Pressure:       [] lo [] med [] hi   Temperature: [] lo [] med [] hi   [] Skin assessment post-treatment:  []intact []redness- no adverse reaction    []redness - adverse reaction:       38 min Therapeutic Exercise:  [x] See flow sheet PT reassessment, updated HEP   Rationale: increase ROM, increase strength, improve balance and increase proprioception to improve the patients ability to improve overall activity tolerance    H min Manual Therapy: Technique:      [x] S/DTM []IASTM []PROM [] Passive Stretching   [] Graston:  [] GT 1  [] GT 2 [x] GT 3 [x] GT 4 [] GT 4 [] GT 5  [] GT 6  [] Sweep [] Fan [] [place]  [] Brush   []  Swivel []J- Stroke [] Scoop []IFraming     []Manual TPR  [] TDN (see objective; actual needle insertion time not billed)  []Jt manipulation:Gr I [] II []  III [] IV[] V[]  Treatment/Area:  L plantar fascia/dorsum of the foot   Rationale:      decrease pain, increase ROM, increase tissue extensibility and decrease trigger points to improve patient's ability to improve weight-bearing tolerance        With   [] TE   [] TA   [] neuro   [] other: Patient Education: [x] Review HEP    [] Progressed/Changed HEP based on:   [] positioning   [] body mechanics   [] transfers   [] heat/ice application    [] Graston Education: Explained the effects and benefits of Graston Technique therapy including potential for post treatment soreness and bruising. [] Other:      Other Objective/Functional Measures:   FOTO improved to 99/100 from 40/100 at evaluation  No swelling noted in the L ankle compared to the R   SLS 24 seconds on the L  Pt is able to perform SL HR on the L 25 without pain    Pain Level (0-10 scale) post treatment: 0/10    ASSESSMENT/Changes in Function:   Upon reassessment, pt presents with improvements in L ankle strength and decreased swelling leading to improvements in overall functional mobility. He has returned to all functional activities and work without increased pain levels therefore, is ready to continue to maintain/maximize gains in PT with updated HEP. Please DC from PT at this time with updated HEP.            Progress towards goals / Updated goals: · Short Term Goals: To be accomplished in  2  treatments:  1. Pt will be compliant with HEP for symptom management at home. Met 8/16/18        · Long Term Goals: To be accomplished in  8-12  treatments:  1. Pt will demonstrate an increased FOTO score to 63/100 in order to improve function  2. Pt will be independent with HEP at D/C for self management. met 8/21/18  3. Pt will demonstrate decreased edema in the left ankle by 1cm in order to increase mobility for functional ambulation met 8/23/19  4. Pt will demonstrate increased girth in the mid gastrocs and 10cm proximal to superior patellar poles by 1cm+ in order to aid in resolving atrophy and improving ability to ambulate NT 8/23/18  5. Pt will be able to perform LLE SLS on dynamic surface for 20sec+ in order to increase balance required for surfing met 8/23/18  6.  Pt will be able to ambulate 1 mile on TM with proper footwear and without c/o left foot pain in order to negotiate [facility] floor as before met 8/23/18       PLAN     [x]  Discharge due to: all goals met, IND with HEP      Inocencio Godinez DPT 8/23/2018  128 PM    Future Appointments  Date Time Provider Vishnu Yi   8/28/2018 11:00 AM Inocencio Godinez DPT [facility]   8/30/2018 11:00 AM Inocencio Godinez DPT [facility]

## 2018-08-28 ENCOUNTER — APPOINTMENT (OUTPATIENT)
Dept: PHYSICAL THERAPY | Age: 23
End: 2018-08-28
Payer: COMMERCIAL

## 2018-08-30 ENCOUNTER — APPOINTMENT (OUTPATIENT)
Dept: PHYSICAL THERAPY | Age: 23
End: 2018-08-30
Payer: COMMERCIAL

## 2019-07-01 NOTE — PROGRESS NOTES
[address] [facility] AT [facility]  [address] [facility] [address] [place] [address],[address], Samuel serrato, [place]  Phone [phone]  Fax  SUMMARY  Patient Name: Angela Teixeira : 1995   Treatment/Medical Diagnosis: Left foot pain [N00.785]   Referral Source: Lolita Cisneros DPM     Date of Initial Visit: 18 Attended Visits: 8 Missed Visits: 0     SUMMARY OF TREATMENT  Pt seen for L ankle pain for 8 sessions. Therapy has included therex for strengthening/stability. neuro-reeducation, manual to improve tissue extensibility, and modalities for pain management. CURRENT STATUS  Pt denies pain with functional mobility. He reports that he has returned to a run/walk program for 30 min without restrictions. He can also work a shift at work without difficulty. Upon reassessment, pt presents with improvements in L ankle strength and decreased swelling leading to improvements in overall functional mobility. He has returned to all functional activities and work without increased pain levels therefore, is ready to continue to maintain/maximize gains in PT with updated HEP. Please DC from PT at this time with updated HEP. Other Objective/Functional Measures:   FOTO improved to 99/100 from 40/100 at evaluation  No swelling noted in the L ankle compared to the R         SLS 24 seconds on the L  Pt is able to perform SL HR on the L 25 without pain      Goal/Measure of Progress Goal Met? · Short Term Goals: To be accomplished in  2  treatments:  1. Pt will be compliant with HEP for symptom management at home. Met 18        · Long Term Goals: To be accomplished in  8-12  treatments:  1. Pt will demonstrate an increased FOTO score to 63/100 in order to improve function  2. Pt will be independent with HEP at D/C for self management. met 18  3. Pt will demonstrate decreased edema in the left ankle by 1cm in order to increase mobility for functional ambulation met 19  4.  Pt will demonstrate increased girth in the mid gastrocs and 10cm proximal to superior patellar poles by 1cm+ in order to aid in resolving atrophy and improving ability to ambulate NT 8/23/18  5. Pt will be able to perform LLE SLS on dynamic surface for 20sec+ in order to increase balance required for surfing met 8/23/18  6. Pt will be able to ambulate 1 mile on TM with proper footwear and without c/o left foot pain in order to negotiate [facility] floor as before met 8/23/18      RECOMMENDATIONS  Discontinue therapy. Progressing towards or have reached established goals. If you have any questions/comments please contact us directly at [phone]. Thank you for allowing us to assist in the care of your patient.     Therapist Signature: Denise Jain DPT Date: 8/23/2018     Time: 1:41 PM

## 2020-09-26 ENCOUNTER — HOSPITAL ENCOUNTER (EMERGENCY)
Age: 25
Discharge: HOME | End: 2020-09-26
Attending: EMERGENCY MEDICINE
Payer: OTHER MISCELLANEOUS

## 2020-09-26 ENCOUNTER — APPOINTMENT (OUTPATIENT)
Dept: CT IMAGING | Age: 25
End: 2020-09-26
Attending: EMERGENCY MEDICINE
Payer: OTHER MISCELLANEOUS

## 2020-09-26 VITALS
TEMPERATURE: 98.3 F | SYSTOLIC BLOOD PRESSURE: 138 MMHG | DIASTOLIC BLOOD PRESSURE: 88 MMHG | RESPIRATION RATE: 16 BRPM | OXYGEN SATURATION: 99 % | HEART RATE: 68 BPM | WEIGHT: 160 LBS

## 2020-09-26 DIAGNOSIS — S39.012A STRAIN OF LUMBAR REGION, INITIAL ENCOUNTER: ICD-10-CM
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
## 2020-09-26 DIAGNOSIS — H53.8 BLURRY VISION: ICD-10-CM

## 2020-09-26 PROCEDURE — 99284 EMERGENCY DEPT VISIT MOD MDM: CPT

## 2020-09-26 PROCEDURE — 70450 CT HEAD/BRAIN W/O DYE: CPT

## 2020-09-26 NOTE — ED NOTES
5:42 AM  09/26/20     Discharge instructions given to pt (name) with verbalization of understanding. Patient accompanied by friend. Patient discharged with the following prescriptions none. Patient discharged to home (destination).       Ellis De Anda RN
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
## 2020-09-26 NOTE — DISCHARGE INSTRUCTIONS

## 2022-04-20 ENCOUNTER — APPOINTMENT (OUTPATIENT)
Dept: GENERAL RADIOLOGY | Age: 27
End: 2022-04-20
Attending: PHYSICIAN ASSISTANT
Payer: COMMERCIAL

## 2022-04-20 ENCOUNTER — HOSPITAL ENCOUNTER (EMERGENCY)
Age: 27
Discharge: HOME | End: 2022-04-20
Attending: EMERGENCY MEDICINE
Payer: COMMERCIAL

## 2022-04-20 VITALS
SYSTOLIC BLOOD PRESSURE: 133 MMHG | TEMPERATURE: 99.3 F | BODY MASS INDEX: 28.25 KG/M2 | WEIGHT: 180 LBS | DIASTOLIC BLOOD PRESSURE: 85 MMHG | HEART RATE: 92 BPM | HEIGHT: 67 IN | OXYGEN SATURATION: 98 % | RESPIRATION RATE: 18 BRPM

## 2022-04-20 DIAGNOSIS — S61.311A LACERATION OF LEFT INDEX FINGER WITHOUT FOREIGN BODY WITH DAMAGE TO NAIL, INITIAL ENCOUNTER: Primary | ICD-10-CM

## 2022-04-20 PROCEDURE — 73140 X-RAY EXAM OF FINGER(S): CPT

## 2022-04-20 PROCEDURE — 75810000293 HC SIMP/SUPERF WND  RPR

## 2022-04-20 PROCEDURE — 99283 EMERGENCY DEPT VISIT LOW MDM: CPT

## 2022-04-20 PROCEDURE — 74011000250 HC RX REV CODE- 250: Performed by: PHYSICIAN ASSISTANT

## 2022-04-20 RX ORDER — LIDOCAINE HYDROCHLORIDE 10 MG/ML
10 INJECTION, SOLUTION EPIDURAL; INFILTRATION; INTRACAUDAL; PERINEURAL ONCE
Status: COMPLETED | OUTPATIENT
Start: 2022-04-20 | End: 2022-04-20

## 2022-04-20 RX ORDER — FAMOTIDINE 40 MG/1
40 TABLET, FILM COATED ORAL DAILY
COMMUNITY

## 2022-04-20 RX ORDER — AMOXICILLIN 250 MG/1
500 CAPSULE ORAL 2 TIMES DAILY
Qty: 28 CAPSULE | Refills: 0 | Status: SHIPPED | OUTPATIENT
Start: 2022-04-20 | End: 2022-04-27

## 2022-04-20 RX ORDER — ESCITALOPRAM OXALATE 10 MG/1
10 TABLET ORAL DAILY
COMMUNITY

## 2022-04-20 RX ORDER — IBUPROFEN 600 MG/1
600 TABLET ORAL EVERY 8 HOURS
Qty: 15 TABLET | Refills: 0 | Status: SHIPPED | OUTPATIENT
Start: 2022-04-20

## 2022-04-20 RX ADMIN — LIDOCAINE HYDROCHLORIDE 10 ML: 10 INJECTION, SOLUTION EPIDURAL; INFILTRATION; INTRACAUDAL; PERINEURAL at 16:20

## 2022-04-20 NOTE — Clinical Note
[address] [place] [address] [facility]  [id number] [facility] [address] [phone]    Work/School Note    Date: 4/20/2022    To Whom It May concern:    Henry Gunn was seen and treated today in the emergency room by the following provider(s):  Attending Provider: Chely Hallman MD  Physician Assistant: Praneeth Lawson PA-C. Henry Gunn is excused from work/school on 04/20/22 and 04/21/22. He is medically clear to return to work/school on 4/22/2022.        Sincerely,          Neil Michele PA-C

## 2022-04-20 NOTE — ED TRIAGE NOTES
Patient c/o laceration to left 2nd digit. He states that laceration \"sprayed a lot of blood everywhere\". States laceration occurred while using 6\" blade knife. States tetanus was 3-4 years ago.

## 2022-04-20 NOTE — ED PROVIDER NOTES
Mr. Bashir Leong is a 28-year-old male with a negative past medical history who presents with an laceration to the left second digit. He cut it with a 6 inch knife. He states his tetanus shot is up-to-date. He is able to control the bleeding prior to arrival.  He said he felt like it hit something hard. He does not bleed or bruise easily and is not taking any blood thinners. He is able to bend his finger. He is having tenderness at the site. Nursing nurses regarding the HPI and triage nursing notes were reviewed. No current facility-administered medications for this encounter. Current Outpatient Medications   Medication Sig    famotidine (PEPCID) 40 mg tablet Take 40 mg by mouth daily.  escitalopram oxalate (Lexapro) 10 mg tablet Take 10 mg by mouth daily.  ibuprofen (MOTRIN) 600 mg tablet Take 1 Tablet by mouth every eight (8) hours.  amoxicillin (AMOXIL) 250 mg capsule Take 2 Capsules by mouth two (2) times a day for 7 days. Past Medical History:   Diagnosis Date    GERD (gastroesophageal reflux disease)     Stress        History reviewed. No pertinent surgical history. History reviewed. No pertinent family history.     Social History     Socioeconomic History    Marital status: SINGLE     Spouse name: Not on file    Number of children: Not on file    Years of education: Not on file    Highest education level: Not on file   Occupational History    Not on file   Tobacco Use    Smoking status: Never Smoker    Smokeless tobacco: Never Used   Substance and Sexual Activity    Alcohol use: Never    Drug use: Never    Sexual activity: Not on file   Other Topics Concern    Not on file   Social History Narrative    Not on file     Social Determinants of Health     Financial Resource Strain:     Difficulty of Paying Living Expenses: Not on file   Food Insecurity:     Worried About Running Out of Food in the Last Year: Not on file    Hugo of Food in the Last Year: Not on file   Transportation Needs:     Lack of Transportation (Medical): Not on file    Lack of Transportation (Non-Medical): Not on file   Physical Activity:     Days of Exercise per Week: Not on file    Minutes of Exercise per Session: Not on file   Stress:     Feeling of Stress : Not on file   Social Connections:     Frequency of Communication with Friends and Family: Not on file    Frequency of Social Gatherings with Friends and Family: Not on file    Attends Yazidi Services: Not on file    Active Member of [address] [place] [address] [employer] or Organizations: Not on file    Attends Club or Organization Meetings: Not on file    Marital Status: Not on file   Intimate Partner Violence:     Fear of Current or Ex-Partner: Not on file    Emotionally Abused: Not on file    Physically Abused: Not on file    Sexually Abused: Not on file   Housing Stability:     Unable to Pay for Housing in the Last Year: Not on file    Number of [place] in the Last Year: Not on file    Unstable Housing in the Last Year: Not on file       No Known Allergies    Patient's primary care provider (as noted in EPIC):  Micki Stauffer MD    Review of Systems   Constitutional: Negative. Skin: Positive for wound. Neurological: Negative. Hematological: Negative. Visit Vitals  /85 (BP 1 Location: Right upper arm)   Pulse 92   Temp 99.3 °F (37.4 °C)   Resp 18   Ht 5' 7\" (1.702 m)   Wt 81.6 kg (180 lb)   SpO2 98%   BMI 28.19 kg/m²       PHYSICAL EXAM:    CONSTITUTIONAL:  Alert, in no apparent distress;  well developed;  well nourished. HEAD:  Normocephalic, atraumatic. EYES:  EOMI. Non-icteric sclera. Normal conjunctiva. ENTM:  Nose:  no rhinorrhea. Throat:  no erythema or exudate, mucous membranes moist.  NECK:  No JVD. Supple  RESPIRATORY:  Chest clear, equal breath sounds, good air movement. CARDIOVASCULAR:  Regular rate and rhythm. No murmurs, rubs, or gallops. GI:  Normal bowel sounds, abdomen soft and non-tender.   No rebound or guarding. BACK:  Non-tender. UPPER EXT:  Normal inspection. Left index finger with good movement. Sensation is intact. Pulses are 2+. There is a laceration above the middle joint area. There is no tendon damage. X-rays are without fracture. LOWER EXT:  No edema, no calf tenderness. Distal pulses intact. NEURO:  Moves all four extremities, and grossly normal motor exam.  SKIN:  No rashes;  Normal for age. PSYCH:  Alert and normal affect. LACERATION SITE:  Left 2nd digit  Distal neuro exam intact. Normal distal active range of motion. Abnormal lab results from this emergency department encounter:  Labs Reviewed - No data to display    Lab values for this patient within approximately the last 12 hours:  No results found for this or any previous visit (from the past 12 hour(s)). Radiologist and cardiologist interpretations if available at time of this note:  XR FINGERS LT 2 OR MORE    Result Date: 4/20/2022  EXAM: XR FINGERS LT 2 OR MORE CLINICAL INDICATION/HISTORY: 23 years Male. laceration ADDITIONAL HISTORY: None TECHNIQUE: 3 views of the left index finger COMPARISON: No comparison study is available at the time of this dictation. FINDINGS: A dressing overlies the finger. No acute fracture or dislocation is detected. Alignment is anatomic. No aggressive osseous lesion is identified. Joint spaces are essentially preserved. No retained radiopaque foreign body detected. No evidence of acute fracture or dislocation. No retained radiopaque foreign body detected. Medication(s) ordered for patient during this emergency visit encounter:  Medications   lidocaine (PF) (XYLOCAINE) 10 mg/mL (1 %) injection 10 mL (10 mL IntraDERMal Given by Provider 4/20/22 1252)         PROCEDURE NOTE:   LACERATION REPAIR    Laceration Repair Site: left 2nd digit  Local anesthetic:  Lidocaine 5cc  Laceration length:  1 cm    The area was prepped with Betadine solution.   Noted anesthetic was injected locally for local anesthesia. The laceration was cleaned with wound cleanser and no debris was removed with wound scrubbing and/or wound irrigation. The noted laceration(s) was/were repaired with s sutures. Five-point 0 nylon was used and there were 4 sutures. The area was dressed and a splint was put on prior to discharge. Patient tolerated the procedure well. IMPRESSION AND MEDICAL DECISION MAKING:  Based upon the patient's presentation with noted HPI and PE, along with the work up done in the emergency department, I believe that the patient is having laceration, status post staple repair in the emergency department. Tetanus status was assessed and was ordered if needed. DIAGNOSIS:  1. Laceration, status post suture repair in emergency department. SPECIFIC PATIENT INSTRUCTIONS FROM THE PHYSICIAN WHO TREATED YOU IN THE ER TODAY:  1. Return if worsening pain, redness, warmth, discharge. 2. If a splint was applied, keep this on to reduce the chance of the staples being pulled out. 3. Sutures out in 7-10 days. 4. You may wash the laceration site, but otherwise keep it dry. Patient is improved, resting quietly and comfortably. The patient will be discharged home. The patient was reassured that these symptoms do not appear to represent a serious or life threatening condition at this time. Warning signs of worsening condition were discussed and understood by the patient. Based on patient's age, coexisting illness, exam, and the results of this ED evaluation, the decision to treat as an outpatient was made. Based on the information available at time of discharge, acute pathology requiring immediate intervention was deemed relative unlikely. While it is impossible to completely exclude the possibility of underlying serious disease or worsening of condition, I feel the relative likelihood is extremely low.  I discussed this uncertainty with the patient, who understood ED evaluation and treatment and felt comfortable with the outpatient treatment plan. All questions regarding care, test results, and follow up were answered. The patient is stable and appropriate to discharge. They understand that they should return to the emergency department for any new or worsening symptoms. I stressed the importance of follow up for repeat assessment and possibly further evaluation/treatment. Dictation disclaimer:  Please note that this dictation was completed with Backdoor, the computer voice recognition software. Quite often unanticipated grammatical, syntax, homophones, and other interpretive errors are inadvertently transcribed by the computer software. Please disregard these errors. Please excuse any errors that have escaped final proofreading. Coding Diagnoses     Clinical Impression:   1. Laceration of left index finger without foreign body with damage to nail, initial encounter        Disposition     Disposition:  Home    Guanaco Cristina PA-C.  @Critical access hospitalO@

## 2022-04-22 ENCOUNTER — HOSPITAL ENCOUNTER (EMERGENCY)
Age: 27
Discharge: STILL ADMITTED OR ADMINISTRATIVE | End: 2022-04-22
Attending: EMERGENCY MEDICINE

## 2024-03-06 NOTE — PROGRESS NOTES
PT DAILY TREATMENT NOTE     Patient Name: Ana Maria Rutherford  Date:2018  : 1995  [x]  Patient  Verified  Payor: BLUE CROSS / Plan: [facility] PPO / Product Type: PPO /    In time:1100 Out time:1145  Total Treatment Time (min): 45  1:1 treatment time: 30  Visit #: 6 of     Treatment Area: Left foot pain [M79.012]    SUBJECTIVE  Pain Level (0-10 scale): 0.5/10  Any medication changes, allergies to medications, adverse drug reactions, diagnosis change, or new procedure performed?: [x] No    [] Yes (see summary sheet for update)  Subjective functional status/changes:   [x] No changes reported      OBJECTIVE    Modality rationale: decrease pain and increase tissue extensibility to improve the patients ability to improve standing tolerance   Min Type Additional Details    [] Estim:  []Unatt       []IFC  []Premod                        []Other:  []w/ice   []w/heat  Position:  Location:    [] Estim: []Att    []TENS instruct  []NMES                    []Other:  []w/US   []w/ice   []w/heat  Position:  Location:    []  Traction: [] Cervical       []Lumbar                       [] Prone          []Supine                       []Intermittent   []Continuous Lbs:  [] before manual  [] after manual    []  Ultrasound: []Continuous   [] Pulsed                           []1MHz   []3MHz W/cm2:  Location:    []  Iontophoresis with dexamethasone         Location: [] Take home patch   [] In clinic   PD []  Ice     [x]  heat  []  Ice massage  []  Laser   []  Anodyne Position: semi-reclined  Location: L foot    []  Laser with stim  []  Other:  Position:  Location:    []  Vasopneumatic Device Pressure:       [] lo [] med [] hi   Temperature: [] lo [] med [] hi   [] Skin assessment post-treatment:  []intact []redness- no adverse reaction    []redness - adverse reaction:       45 min Therapeutic Exercise:  [x] See flow sheet :progressed PREs per flow sheet   Rationale: increase ROM, increase strength, improve balance and increase proprioception to improve the patients ability to improve overall activity tolerance    H min Manual Therapy: Technique:      [x] S/DTM []IASTM []PROM [] Passive Stretching   [] Graston:  [] GT 1  [] GT 2 [x] GT 3 [x] GT 4 [] GT 4 [] GT 5  [] GT 6  [] Sweep [] Fan [] [place]  [] Brush   []  Swivel []J- Stroke [] Scoop []IFraming     []Manual TPR  [] TDN (see objective; actual needle insertion time not billed)  []Jt manipulation:Gr I [] II []  III [] IV[] V[]  Treatment/Area:  L plantar fascia/dorsum of the foot   Rationale:      decrease pain, increase ROM, increase tissue extensibility and decrease trigger points to improve patient's ability to improve weight-bearing tolerance        With   [] TE   [] TA   [] neuro   [] other: Patient Education: [x] Review HEP    [] Progressed/Changed HEP based on:   [] positioning   [] body mechanics   [] transfers   [] heat/ice application    [] Graston Education: Explained the effects and benefits of Graston Technique therapy including potential for post treatment soreness and bruising. [] Other:      Other Objective/Functional Measures:   Pt was able to run 90 sec at 5.5 mph and walk at 3.5 mph for 4 sets without increased pain  He was able to perform 8\" box jumps without pain  Noted functional glut weakness as he was only able to perform 25% ROM through single leg bridge         Pain Level (0-10 scale) post treatment: 0/10    ASSESSMENT/Changes in Function:   Pt tolerated advanced plyometric exercises without increased pain in the L foot. Pt progressing well towards long-term goals and is ready for DC in 2 sessions to [facility]. Patient will continue to benefit from skilled PT services to modify and progress therapeutic interventions, address functional mobility deficits, address ROM deficits, address strength deficits, analyze and address soft tissue restrictions and address imbalance/dizziness to attain remaining goals.            Progress towards goals / Updated goals: · Short Term Goals: To be accomplished in  2  treatments:  1. Pt will be compliant with HEP for symptom management at home. Met 8/16/18        · Long Term Goals: To be accomplished in  8-12  treatments:  1. Pt will demonstrate an increased FOTO score to 63/100 in order to improve function  2. Pt will be independent with HEP at D/C for self management. 3. Pt will demonstrate decreased edema in the left ankle by 1cm in order to increase mobility for functional ambulation  4. Pt will demonstrate increased girth in the mid gastrocs and 10cm proximal to superior patellar poles by 1cm+ in order to aid in resolving atrophy and improving ability to ambulate  5. Pt will be able to perform LLE SLS on dynamic surface for 20sec+ in order to increase balance required for surfing  6.  Pt will be able to ambulate 1 mile on TM with proper footwear and without c/o left foot pain in order to negotiate [employer] concrete floor as beforeprogressing, pt ran 2 miles with pain 8/9/18       PLAN  [x]  Upgrade activities as tolerated     [x]  Continue plan of care  []  Update interventions per flow sheet       []  Discharge due to:_  [x]  Other: check goals next session    Nicole Grace DPT 8/16/2018  128 PM    Future Appointments  Date Time Provider Vishnu Yi   8/21/2018 11:00 AM Nicole Garce DPT [facility]   8/23/2018 11:00 AM Nicole Grace DPT [facility]   8/28/2018 11:00 AM Nicole Grace DPT [facility]   8/30/2018 11:00 AM Nicole Grace DPT [facility] Render Risk Assessment In Note?: no Detail Level: Simple Additional Notes: Discussed with patient today that we would like her to try Hydroxychlorqiun get labs done CBC, CMP and a HEP PANEL , we would also need her to have an eye exam The eye DrRadha would have to give her clearance to that the medication.